# Patient Record
Sex: FEMALE | Race: WHITE | NOT HISPANIC OR LATINO | Employment: FULL TIME | URBAN - NONMETROPOLITAN AREA
[De-identification: names, ages, dates, MRNs, and addresses within clinical notes are randomized per-mention and may not be internally consistent; named-entity substitution may affect disease eponyms.]

---

## 2017-03-27 DIAGNOSIS — G47.419 NARCOLEPSY WITHOUT CATAPLEXY(347.00): ICD-10-CM

## 2017-03-28 RX ORDER — DEXTROAMPHETAMINE SACCHARATE, AMPHETAMINE ASPARTATE, DEXTROAMPHETAMINE SULFATE AND AMPHETAMINE SULFATE 2.5; 2.5; 2.5; 2.5 MG/1; MG/1; MG/1; MG/1
10 TABLET ORAL 3 TIMES DAILY
Qty: 90 TABLET | Refills: 0 | Status: SHIPPED | OUTPATIENT
Start: 2017-03-28 | End: 2017-04-26

## 2017-04-26 DIAGNOSIS — G47.419 NARCOLEPSY WITHOUT CATAPLEXY(347.00): ICD-10-CM

## 2017-05-02 RX ORDER — DEXTROAMPHETAMINE SACCHARATE, AMPHETAMINE ASPARTATE, DEXTROAMPHETAMINE SULFATE AND AMPHETAMINE SULFATE 2.5; 2.5; 2.5; 2.5 MG/1; MG/1; MG/1; MG/1
10 TABLET ORAL 3 TIMES DAILY
Qty: 90 TABLET | Refills: 0 | Status: SHIPPED | OUTPATIENT
Start: 2017-05-26 | End: 2017-07-07

## 2017-05-02 RX ORDER — DEXTROAMPHETAMINE SACCHARATE, AMPHETAMINE ASPARTATE, DEXTROAMPHETAMINE SULFATE AND AMPHETAMINE SULFATE 2.5; 2.5; 2.5; 2.5 MG/1; MG/1; MG/1; MG/1
TABLET ORAL
Qty: 90 TABLET | Refills: 0 | Status: SHIPPED | OUTPATIENT
Start: 2017-06-26 | End: 2017-07-07

## 2017-05-02 RX ORDER — DEXTROAMPHETAMINE SACCHARATE, AMPHETAMINE ASPARTATE, DEXTROAMPHETAMINE SULFATE AND AMPHETAMINE SULFATE 2.5; 2.5; 2.5; 2.5 MG/1; MG/1; MG/1; MG/1
10 TABLET ORAL 3 TIMES DAILY
Qty: 90 TABLET | Refills: 0 | Status: SHIPPED | OUTPATIENT
Start: 2017-05-02 | End: 2017-07-07

## 2017-07-07 DIAGNOSIS — G47.419 NARCOLEPSY WITHOUT CATAPLEXY(347.00): ICD-10-CM

## 2017-07-11 RX ORDER — DEXTROAMPHETAMINE SACCHARATE, AMPHETAMINE ASPARTATE, DEXTROAMPHETAMINE SULFATE AND AMPHETAMINE SULFATE 2.5; 2.5; 2.5; 2.5 MG/1; MG/1; MG/1; MG/1
10 TABLET ORAL 3 TIMES DAILY
Qty: 90 TABLET | Refills: 0 | Status: SHIPPED | OUTPATIENT
Start: 2017-09-08 | End: 2017-10-03

## 2017-07-11 RX ORDER — DEXTROAMPHETAMINE SACCHARATE, AMPHETAMINE ASPARTATE, DEXTROAMPHETAMINE SULFATE AND AMPHETAMINE SULFATE 2.5; 2.5; 2.5; 2.5 MG/1; MG/1; MG/1; MG/1
TABLET ORAL
Qty: 90 TABLET | Refills: 0 | Status: SHIPPED | OUTPATIENT
Start: 2017-07-11 | End: 2017-10-03

## 2017-07-11 RX ORDER — DEXTROAMPHETAMINE SACCHARATE, AMPHETAMINE ASPARTATE, DEXTROAMPHETAMINE SULFATE AND AMPHETAMINE SULFATE 2.5; 2.5; 2.5; 2.5 MG/1; MG/1; MG/1; MG/1
10 TABLET ORAL 3 TIMES DAILY
Qty: 90 TABLET | Refills: 0 | Status: SHIPPED | OUTPATIENT
Start: 2017-08-08 | End: 2017-10-03

## 2017-07-28 ENCOUNTER — TELEPHONE (OUTPATIENT)
Dept: SLEEP MEDICINE | Facility: HOSPITAL | Age: 42
End: 2017-07-28

## 2017-10-03 DIAGNOSIS — G47.419 NARCOLEPSY WITHOUT CATAPLEXY(347.00): ICD-10-CM

## 2017-10-03 RX ORDER — DEXTROAMPHETAMINE SACCHARATE, AMPHETAMINE ASPARTATE, DEXTROAMPHETAMINE SULFATE AND AMPHETAMINE SULFATE 2.5; 2.5; 2.5; 2.5 MG/1; MG/1; MG/1; MG/1
10 TABLET ORAL 3 TIMES DAILY
Qty: 90 TABLET | Refills: 0 | Status: SHIPPED | OUTPATIENT
Start: 2017-12-01 | End: 2017-12-12

## 2017-10-03 RX ORDER — DEXTROAMPHETAMINE SACCHARATE, AMPHETAMINE ASPARTATE, DEXTROAMPHETAMINE SULFATE AND AMPHETAMINE SULFATE 2.5; 2.5; 2.5; 2.5 MG/1; MG/1; MG/1; MG/1
TABLET ORAL
Qty: 90 TABLET | Refills: 0 | Status: SHIPPED | OUTPATIENT
Start: 2017-10-03 | End: 2017-12-12

## 2017-10-03 RX ORDER — DEXTROAMPHETAMINE SACCHARATE, AMPHETAMINE ASPARTATE, DEXTROAMPHETAMINE SULFATE AND AMPHETAMINE SULFATE 2.5; 2.5; 2.5; 2.5 MG/1; MG/1; MG/1; MG/1
10 TABLET ORAL 3 TIMES DAILY
Qty: 90 TABLET | Refills: 0 | Status: SHIPPED | OUTPATIENT
Start: 2017-11-03 | End: 2017-12-12

## 2017-11-03 ENCOUNTER — TELEPHONE (OUTPATIENT)
Dept: SLEEP MEDICINE | Facility: HOSPITAL | Age: 42
End: 2017-11-03

## 2017-11-26 ENCOUNTER — HEALTH MAINTENANCE LETTER (OUTPATIENT)
Age: 42
End: 2017-11-26

## 2017-12-12 DIAGNOSIS — G47.419 NARCOLEPSY WITHOUT CATAPLEXY(347.00): ICD-10-CM

## 2017-12-12 RX ORDER — DEXTROAMPHETAMINE SACCHARATE, AMPHETAMINE ASPARTATE, DEXTROAMPHETAMINE SULFATE AND AMPHETAMINE SULFATE 2.5; 2.5; 2.5; 2.5 MG/1; MG/1; MG/1; MG/1
10 TABLET ORAL 3 TIMES DAILY
Qty: 90 TABLET | Refills: 0 | Status: SHIPPED | OUTPATIENT
Start: 2018-01-12 | End: 2018-02-14

## 2017-12-12 RX ORDER — DEXTROAMPHETAMINE SACCHARATE, AMPHETAMINE ASPARTATE, DEXTROAMPHETAMINE SULFATE AND AMPHETAMINE SULFATE 2.5; 2.5; 2.5; 2.5 MG/1; MG/1; MG/1; MG/1
10 TABLET ORAL 3 TIMES DAILY
Qty: 90 TABLET | Refills: 0 | Status: SHIPPED | OUTPATIENT
Start: 2018-02-12 | End: 2018-02-14

## 2017-12-12 RX ORDER — DEXTROAMPHETAMINE SACCHARATE, AMPHETAMINE ASPARTATE, DEXTROAMPHETAMINE SULFATE AND AMPHETAMINE SULFATE 2.5; 2.5; 2.5; 2.5 MG/1; MG/1; MG/1; MG/1
TABLET ORAL
Qty: 90 TABLET | Refills: 0 | Status: SHIPPED | OUTPATIENT
Start: 2017-12-12 | End: 2018-02-14

## 2018-02-14 DIAGNOSIS — G47.419 NARCOLEPSY WITHOUT CATAPLEXY(347.00): ICD-10-CM

## 2018-02-14 RX ORDER — DEXTROAMPHETAMINE SACCHARATE, AMPHETAMINE ASPARTATE, DEXTROAMPHETAMINE SULFATE AND AMPHETAMINE SULFATE 2.5; 2.5; 2.5; 2.5 MG/1; MG/1; MG/1; MG/1
TABLET ORAL
Qty: 90 TABLET | Refills: 0 | Status: SHIPPED | OUTPATIENT
Start: 2018-04-13 | End: 2018-03-14

## 2018-02-14 RX ORDER — DEXTROAMPHETAMINE SACCHARATE, AMPHETAMINE ASPARTATE, DEXTROAMPHETAMINE SULFATE AND AMPHETAMINE SULFATE 2.5; 2.5; 2.5; 2.5 MG/1; MG/1; MG/1; MG/1
TABLET ORAL
Qty: 90 TABLET | Refills: 0 | Status: SHIPPED | OUTPATIENT
Start: 2018-02-14 | End: 2018-03-14

## 2018-02-14 RX ORDER — DEXTROAMPHETAMINE SACCHARATE, AMPHETAMINE ASPARTATE, DEXTROAMPHETAMINE SULFATE AND AMPHETAMINE SULFATE 2.5; 2.5; 2.5; 2.5 MG/1; MG/1; MG/1; MG/1
TABLET ORAL
Qty: 90 TABLET | Refills: 0 | Status: SHIPPED | OUTPATIENT
Start: 2018-03-14 | End: 2018-03-14

## 2018-03-14 DIAGNOSIS — G47.419 NARCOLEPSY WITHOUT CATAPLEXY(347.00): ICD-10-CM

## 2018-03-20 RX ORDER — DEXTROAMPHETAMINE SACCHARATE, AMPHETAMINE ASPARTATE, DEXTROAMPHETAMINE SULFATE AND AMPHETAMINE SULFATE 2.5; 2.5; 2.5; 2.5 MG/1; MG/1; MG/1; MG/1
TABLET ORAL
Qty: 90 TABLET | Refills: 0 | Status: SHIPPED | OUTPATIENT
Start: 2018-04-20

## 2018-03-20 RX ORDER — DEXTROAMPHETAMINE SACCHARATE, AMPHETAMINE ASPARTATE, DEXTROAMPHETAMINE SULFATE AND AMPHETAMINE SULFATE 2.5; 2.5; 2.5; 2.5 MG/1; MG/1; MG/1; MG/1
TABLET ORAL
Qty: 90 TABLET | Refills: 0 | Status: SHIPPED | OUTPATIENT
Start: 2018-05-18

## 2018-03-20 RX ORDER — DEXTROAMPHETAMINE SACCHARATE, AMPHETAMINE ASPARTATE, DEXTROAMPHETAMINE SULFATE AND AMPHETAMINE SULFATE 2.5; 2.5; 2.5; 2.5 MG/1; MG/1; MG/1; MG/1
TABLET ORAL
Qty: 90 TABLET | Refills: 0 | Status: SHIPPED | OUTPATIENT
Start: 2018-03-20

## 2018-04-17 ENCOUNTER — TELEPHONE (OUTPATIENT)
Dept: SLEEP MEDICINE | Facility: HOSPITAL | Age: 43
End: 2018-04-17

## 2018-04-17 DIAGNOSIS — G47.419 NARCOLEPSY WITHOUT CATAPLEXY(347.00): ICD-10-CM

## 2018-04-17 RX ORDER — DEXTROAMPHETAMINE SACCHARATE, AMPHETAMINE ASPARTATE, DEXTROAMPHETAMINE SULFATE AND AMPHETAMINE SULFATE 2.5; 2.5; 2.5; 2.5 MG/1; MG/1; MG/1; MG/1
TABLET ORAL
Qty: 90 TABLET | Refills: 0 | Status: CANCELLED | OUTPATIENT
Start: 2018-04-20

## 2018-04-17 RX ORDER — DEXTROAMPHETAMINE SACCHARATE, AMPHETAMINE ASPARTATE, DEXTROAMPHETAMINE SULFATE AND AMPHETAMINE SULFATE 2.5; 2.5; 2.5; 2.5 MG/1; MG/1; MG/1; MG/1
TABLET ORAL
Qty: 90 TABLET | Refills: 0 | Status: CANCELLED | OUTPATIENT
Start: 2018-04-17

## 2018-04-17 RX ORDER — DEXTROAMPHETAMINE SACCHARATE, AMPHETAMINE ASPARTATE, DEXTROAMPHETAMINE SULFATE AND AMPHETAMINE SULFATE 2.5; 2.5; 2.5; 2.5 MG/1; MG/1; MG/1; MG/1
TABLET ORAL
Qty: 90 TABLET | Refills: 0 | Status: CANCELLED | OUTPATIENT
Start: 2018-05-18

## 2018-05-30 ENCOUNTER — OFFICE VISIT (OUTPATIENT)
Dept: SLEEP MEDICINE | Facility: HOSPITAL | Age: 43
End: 2018-05-30
Attending: INTERNAL MEDICINE
Payer: MEDICAID

## 2018-05-30 VITALS
BODY MASS INDEX: 31.76 KG/M2 | SYSTOLIC BLOOD PRESSURE: 112 MMHG | WEIGHT: 186 LBS | HEART RATE: 85 BPM | OXYGEN SATURATION: 97 % | DIASTOLIC BLOOD PRESSURE: 80 MMHG | HEIGHT: 64 IN | RESPIRATION RATE: 14 BRPM

## 2018-05-30 DIAGNOSIS — G47.419 NARCOLEPSY WITHOUT CATAPLEXY(347.00): Primary | ICD-10-CM

## 2018-05-30 PROCEDURE — G0463 HOSPITAL OUTPT CLINIC VISIT: HCPCS

## 2018-05-30 PROCEDURE — 99211 OFF/OP EST MAY X REQ PHY/QHP: CPT | Performed by: INTERNAL MEDICINE

## 2018-05-30 RX ORDER — MODAFINIL 200 MG/1
200 TABLET ORAL DAILY
Qty: 30 TABLET | Refills: 3 | Status: SHIPPED | OUTPATIENT
Start: 2018-05-30

## 2018-05-30 RX ORDER — MODAFINIL 200 MG/1
200 TABLET ORAL DAILY
Qty: 30 TABLET | Refills: 0 | Status: SHIPPED | OUTPATIENT
Start: 2018-05-30 | End: 2018-05-30

## 2018-05-30 NOTE — NURSING NOTE
Patient ID checked with name and date of birth. Reviewed allergies and home medications. Took Vitals and brief medicalhistory.

## 2018-05-30 NOTE — MR AVS SNAPSHOT
"              After Visit Summary   2018    Karon Fleming    MRN: [id number]           Patient Information     Date Of Birth          1975        Visit Information        Provider Department      2018 11:30 AM David De La Cruz MD [facility]        Today's Diagnoses     Narcolepsy without cataplexy(347.00)    -  1       Follow-ups after your visit        Who to contact     If you have questions or need follow up information about today's clinic visit or your schedule please contact [facility] directly at [phone].  Normal or non-critical lab and imaging results will be communicated to you by NorSunhart, letter or phone within 4 business days after the clinic has received the results. If you do not hear from us within 7 days, please contact the clinic through Panoratiot or phone. If you have a critical or abnormal lab result, we will notify you by phone as soon as possible.  Submit refill requests through Colppy or call your pharmacy and they will forward the refill request to us. Please allow 3 business days for your refill to be completed.          Additional Information About Your Visit        MyChart Information     Colppy lets you send messages to your doctor, view your test results, renew your prescriptions, schedule appointments and more. To sign up, go to [email or web address] . Click on \"Log in\" on the left side of the screen, which will take you to the Welcome page. Then click on \"Sign up Now\" on the right side of the page.     You will be asked to enter the access code listed below, as well as some personal information. Please follow the directions to create your username and password.     Your access code is: [email or web address]  Expires: 2018 12:09 PM     Your access code will  in 90 days. If you need help or a new code, please call your [facility] or [phone].        Care EveryWhere ID     This is your Care EveryWhere ID. This could be used by other organizations to " "access your [facility] medical records  [id number]        Your Vitals Were     Pulse Respirations Height Pulse Oximetry BMI (Body Mass Index)       85 14 5' 4\" (1.626 m) 97% 31.93 kg/m2        Blood Pressure from Last 3 Encounters:   05/30/18 112/80   08/04/16 124/78   06/30/16 114/72    Weight from Last 3 Encounters:   05/30/18 186 lb (84.4 kg)   06/30/16 187 lb (84.8 kg)   05/12/16 180 lb (81.6 kg)              Today, you had the following     No orders found for display         Today's Medication Changes          These changes are accurate as of 5/30/18 12:09 PM.  If you have any questions, ask your nurse or doctor.               Start taking these medicines.        Dose/Directions    modafinil 200 MG tablet   Commonly known as:  PROVIGIL   Used for:  Narcolepsy without cataplexy(347.00)        Dose:  200 mg   Take 1 tablet (200 mg) by mouth daily   Quantity:  30 tablet   Refills:  3            Where to get your medicines      Some of these will need a paper prescription and others can be bought over the counter.  Ask your nurse if you have questions.     Bring a paper prescription for each of these medications     modafinil 200 MG tablet                Primary Care Provider Office Phone # Fax #    CELSA Bucio [phone]       [address] [facility] [address]        Equal Access to Services     [facility]: Hadii omar ku hadasho Somargaali, waaxda luqadaha, qaybta kaalmada adeegyada, albino trammell . So [facility] [phone].    ATENCIÓN: Si habla español, tiene a cazares disposición servicios gratuitos de asistencia lingüística. Llame al [phone].    We comply with applicable federal civil rights laws and [place] laws. We do not discriminate on the basis of race, color, national origin, age, disability, sex, sexual orientation, or gender identity.            Thank you!     Thank you for choosing [facility]  for your care. Our goal is always to provide you " with excellent care. Hearing back from our patients is one way we can continue to improve our services. Please take a few minutes to complete the written survey that you may receive in the mail after your visit with us. Thank you!             Your Updated Medication List - Protect others around you: Learn how to safely use, store and throw away your medicines at www.disposemymeds.org.          This list is accurate as of 5/30/18 12:09 PM.  Always use your most recent med list.                   Brand Name Dispense Instructions for use Diagnosis    ALEVE PO      Take 220 mg by mouth        * amphetamine-dextroamphetamine 10 MG per tablet    ADDERALL    90 tablet    2 pills in AM, 1 at noon    Narcolepsy without cataplexy(347.00)       * amphetamine-dextroamphetamine 10 MG per tablet    ADDERALL    90 tablet    Take two in the am and one at noon    Narcolepsy without cataplexy(347.00)       * amphetamine-dextroamphetamine 10 MG per tablet    ADDERALL    90 tablet    Take two tabs in am and one at noon    Narcolepsy without cataplexy(347.00)       ASPIRIN PO      Take 81 mg by mouth        INDERAL LA PO      Take 80 mg by mouth daily        loratadine 10 MG tablet    CLARITIN     Take 10 mg by mouth daily        modafinil 200 MG tablet    PROVIGIL    30 tablet    Take 1 tablet (200 mg) by mouth daily    Narcolepsy without cataplexy(347.00)       montelukast 10 MG tablet    SINGULAIR     Take 10 mg by mouth At Bedtime        WELLBUTRIN XL PO      Take 150 mg by mouth daily        ZOCOR PO      Take 10 mg by mouth        * Notice:  This list has 3 medication(s) that are the same as other medications prescribed for you. Read the directions carefully, and ask your doctor or other care provider to review them with you.

## 2018-05-30 NOTE — PROGRESS NOTES
"Has been on adderall 10 tid for a couple years, works pretty well but has developed some tolerance. She does try hard to avoid taking it on weekends. We talked about the problems associated with taking these often only partially effective meds, etc.  PE   /80  Pulse 85  Resp 14  Ht 5' 4\" (1.626 m)  Wt 186 lb (84.4 kg)  SpO2 97%  BMI 31.93 kg/m2                     Current Outpatient Prescriptions:      modafinil (PROVIGIL) 200 MG tablet, Take 1 tablet (200 mg) by mouth daily, Disp: 30 tablet, Rfl: 0     amphetamine-dextroamphetamine (ADDERALL) 10 MG per tablet, 2 pills in AM, 1 at noon, Disp: 90 tablet, Rfl: 0     amphetamine-dextroamphetamine (ADDERALL) 10 MG per tablet, Take two in the am and one at noon, Disp: 90 tablet, Rfl: 0     amphetamine-dextroamphetamine (ADDERALL) 10 MG per tablet, Take two tabs in am and one at noon, Disp: 90 tablet, Rfl: 0     ASPIRIN PO, Take 81 mg by mouth, Disp: , Rfl:      BuPROPion HCl (WELLBUTRIN XL PO), Take 150 mg by mouth daily , Disp: , Rfl:      loratadine (CLARITIN) 10 MG tablet, Take 10 mg by mouth daily, Disp: , Rfl:      montelukast (SINGULAIR) 10 MG tablet, Take 10 mg by mouth At Bedtime, Disp: , Rfl:      Naproxen Sodium (ALEVE PO), Take 220 mg by mouth, Disp: , Rfl:      Propranolol HCl (INDERAL LA PO), Take 80 mg by mouth daily, Disp: , Rfl:      Simvastatin (ZOCOR PO), Take 10 mg by mouth, Disp: , Rfl:      A/ Narcolepsy partially treated, will add modafinil 200 as trial and cont adderall 30 mg total daily.    "

## 2022-10-05 ENCOUNTER — HOSPITAL ENCOUNTER (EMERGENCY)
Facility: HOSPITAL | Age: 47
Discharge: HOME | End: 2022-10-05
Attending: PHYSICIAN ASSISTANT | Admitting: PHYSICIAN ASSISTANT
Payer: COMMERCIAL

## 2022-10-05 VITALS
RESPIRATION RATE: 16 BRPM | TEMPERATURE: 97.8 F | OXYGEN SATURATION: 98 % | DIASTOLIC BLOOD PRESSURE: 99 MMHG | HEART RATE: 99 BPM | SYSTOLIC BLOOD PRESSURE: 155 MMHG

## 2022-10-05 DIAGNOSIS — S05.02XA ABRASION OF LEFT CORNEA, INITIAL ENCOUNTER: ICD-10-CM

## 2022-10-05 PROCEDURE — 99213 OFFICE O/P EST LOW 20 MIN: CPT | Performed by: PHYSICIAN ASSISTANT

## 2022-10-05 PROCEDURE — 250N000009 HC RX 250: Performed by: PHYSICIAN ASSISTANT

## 2022-10-05 PROCEDURE — G0463 HOSPITAL OUTPT CLINIC VISIT: HCPCS

## 2022-10-05 RX ORDER — ERYTHROMYCIN 5 MG/G
OINTMENT OPHTHALMIC ONCE
Status: COMPLETED | OUTPATIENT
Start: 2022-10-05 | End: 2022-10-05

## 2022-10-05 RX ORDER — TETRACAINE HYDROCHLORIDE 5 MG/ML
1-2 SOLUTION OPHTHALMIC ONCE
Status: COMPLETED | OUTPATIENT
Start: 2022-10-05 | End: 2022-10-05

## 2022-10-05 RX ADMIN — TETRACAINE HYDROCHLORIDE 2 DROP: 5 SOLUTION OPHTHALMIC at 17:51

## 2022-10-05 RX ADMIN — ERYTHROMYCIN: 5 OINTMENT OPHTHALMIC at 18:09

## 2022-10-05 ASSESSMENT — ENCOUNTER SYMPTOMS
FEVER: 0
EYE PAIN: 1
PHOTOPHOBIA: 1
EYE REDNESS: 1

## 2022-10-05 NOTE — ED TRIAGE NOTES
Patient states that she had  a cramp above her left eye. States she did lay down for a nap and that she feels like something is in her eye. Does endorse still having her contact in as well.

## 2022-10-05 NOTE — ED TRIAGE NOTES
"Pt presents with pain above left eye. Pt states it feels like \"nerve\" pain . Pain started today. Pt rates current pain 10/10.       "

## 2022-10-05 NOTE — ED PROVIDER NOTES
History     Chief Complaint   Patient presents with     Eye Pain     HPI  Karon Fleming is a 47 year old female who presents to urgent care for evaluation of left eye problem.  Patient states over the last couple days she has noted some crusting  In the corner of her left eye.  She states that today she developed severe redness, pain and photophobia in her left eye.  She denies any mechanism of injury, purulent discharge, cold symptoms, vision changes, or any other associated symptoms.    Allergies:  Allergies   Allergen Reactions     Topamax [Topiramate] Anxiety       Problem List:    There are no problems to display for this patient.       Past Medical History:    No past medical history on file.    Past Surgical History:    No past surgical history on file.    Family History:    No family history on file.    Social History:  Marital Status:   [2]  Social History     Tobacco Use     Smoking status: Current Every Day Smoker     Packs/day: 0.25   Substance Use Topics     Alcohol use: Yes     Comment: 1x month        Medications:    amphetamine-dextroamphetamine (ADDERALL) 10 MG per tablet  amphetamine-dextroamphetamine (ADDERALL) 10 MG per tablet  amphetamine-dextroamphetamine (ADDERALL) 10 MG per tablet  ASPIRIN PO  BuPROPion HCl (WELLBUTRIN XL PO)  loratadine (CLARITIN) 10 MG tablet  modafinil (PROVIGIL) 200 MG tablet  montelukast (SINGULAIR) 10 MG tablet  Naproxen Sodium (ALEVE PO)  Propranolol HCl (INDERAL LA PO)  Simvastatin (ZOCOR PO)          Review of Systems   Constitutional: Negative for fever.   Eyes: Positive for photophobia, pain and redness. Negative for visual disturbance.   All other systems reviewed and are negative.      Physical Exam   BP: 155/99  Pulse: 99  Temp: 97.8  F (36.6  C)  Resp: 16  SpO2: 98 %      Physical Exam  Vitals and nursing note reviewed.   Constitutional:       General: She is not in acute distress.     Appearance: Normal appearance. She is not ill-appearing or  toxic-appearing.   Eyes:      Extraocular Movements: Extraocular movements intact.      Conjunctiva/sclera:      Left eye: Left conjunctiva is injected.        Comments: Fluorescein uptake noted at 12 o'clock position of border of iris and sclera consistent with corneal abrasion..   Cardiovascular:      Rate and Rhythm: Regular rhythm.      Heart sounds: Normal heart sounds.   Pulmonary:      Breath sounds: Normal breath sounds.   Neurological:      Mental Status: She is oriented to person, place, and time.         ED Course                 Procedures             Critical Care time:               No results found for this or any previous visit (from the past 24 hour(s)).    Medications   tetracaine (PONTOCAINE) 0.5 % ophthalmic solution 1-2 drop (2 drops Left Eye Given 10/5/22 1751)   erythromycin (ROMYCIN) ophthalmic ointment ( Left Eye Given 10/5/22 1809)       Assessments & Plan (with Medical Decision Making)   #1.  Corneal abrasion    Discussed exam findings with patient.  Patient is instructed to leave contacts out of her eye to avoid continued irritation.  Patient was given erythromycin ointment tonight in urgent care and is instructed to use it twice a day for the next 7 days.  I would like patient to follow-up tomorrow with an eye clinic for further evaluation.  Any additional concerns patient can return to urgent care or follow-up with primary care provider.  Patient verbalized understanding and agreement of plan.        I have reviewed the nursing notes.    I have reviewed the findings, diagnosis, plan and need for follow up with the patient.    New Prescriptions    No medications on file       Final diagnoses:   Abrasion of left cornea, initial encounter       10/5/2022   [facility]     Remy Storey PA-C  10/05/22 6249

## 2023-01-15 PROCEDURE — 99285 EMERGENCY DEPT VISIT HI MDM: CPT | Mod: 25

## 2023-01-15 PROCEDURE — 99283 EMERGENCY DEPT VISIT LOW MDM: CPT | Performed by: EMERGENCY MEDICINE

## 2023-01-16 ENCOUNTER — HOSPITAL ENCOUNTER (EMERGENCY)
Facility: HOSPITAL | Age: 48
Discharge: HOME | End: 2023-01-16
Attending: EMERGENCY MEDICINE | Admitting: EMERGENCY MEDICINE
Payer: COMMERCIAL

## 2023-01-16 ENCOUNTER — APPOINTMENT (OUTPATIENT)
Dept: GENERAL RADIOLOGY | Facility: HOSPITAL | Age: 48
End: 2023-01-16
Attending: EMERGENCY MEDICINE
Payer: COMMERCIAL

## 2023-01-16 ENCOUNTER — APPOINTMENT (OUTPATIENT)
Dept: CT IMAGING | Facility: HOSPITAL | Age: 48
End: 2023-01-16
Attending: EMERGENCY MEDICINE
Payer: COMMERCIAL

## 2023-01-16 VITALS
DIASTOLIC BLOOD PRESSURE: 84 MMHG | OXYGEN SATURATION: 97 % | WEIGHT: 180 LBS | HEIGHT: 64 IN | SYSTOLIC BLOOD PRESSURE: 132 MMHG | RESPIRATION RATE: 18 BRPM | HEART RATE: 91 BPM | TEMPERATURE: 97.5 F | BODY MASS INDEX: 30.73 KG/M2

## 2023-01-16 DIAGNOSIS — S93.402A SPRAIN OF LEFT ANKLE, UNSPECIFIED LIGAMENT, INITIAL ENCOUNTER: ICD-10-CM

## 2023-01-16 PROCEDURE — 73700 CT LOWER EXTREMITY W/O DYE: CPT | Mod: LT

## 2023-01-16 PROCEDURE — 250N000013 HC RX MED GY IP 250 OP 250 PS 637: Performed by: EMERGENCY MEDICINE

## 2023-01-16 PROCEDURE — 73630 X-RAY EXAM OF FOOT: CPT | Mod: LT

## 2023-01-16 PROCEDURE — 73610 X-RAY EXAM OF ANKLE: CPT | Mod: LT

## 2023-01-16 RX ORDER — IBUPROFEN 800 MG/1
800 TABLET, FILM COATED ORAL ONCE
Status: COMPLETED | OUTPATIENT
Start: 2023-01-16 | End: 2023-01-16

## 2023-01-16 RX ADMIN — IBUPROFEN 800 MG: 800 TABLET ORAL at 00:21

## 2023-01-16 ASSESSMENT — ACTIVITIES OF DAILY LIVING (ADL)
ADLS_ACUITY_SCORE: 35
ADLS_ACUITY_SCORE: 35

## 2023-01-16 NOTE — ED TRIAGE NOTES
Twisted ankle shovering a couple of weeks ago. Was wearing boot and was able to walk on it, was improving. Yesterday evening twisted left foot again and now has 6/10 pain at rest. CMS intact. Some swelling noted to left ankle.

## 2023-01-20 ASSESSMENT — ENCOUNTER SYMPTOMS
FEVER: 0
COUGH: 0
SHORTNESS OF BREATH: 0
CHILLS: 0

## 2023-01-20 NOTE — ED PROVIDER NOTES
"  History     Chief Complaint   Patient presents with     Foot Pain     HPI  Karon Fleming is a 47 year old female who is here with left foot and ankle pain.  Twisted it yesterday evening.  Able to ambulate however with marked difficulty.  No decrease in range of motion.  No decrease in sensation.  No other complaints.    Allergies:  Allergies   Allergen Reactions     Topamax [Topiramate] Anxiety       Problem List:    There are no problems to display for this patient.       Past Medical History:    No past medical history on file.    Past Surgical History:    No past surgical history on file.    Family History:    No family history on file.    Social History:  Marital Status:   [2]  Social History     Tobacco Use     Smoking status: Every Day     Packs/day: 0.25     Types: Cigarettes   Substance Use Topics     Alcohol use: Yes     Comment: 1x month        Medications:    amphetamine-dextroamphetamine (ADDERALL) 10 MG per tablet  amphetamine-dextroamphetamine (ADDERALL) 10 MG per tablet  amphetamine-dextroamphetamine (ADDERALL) 10 MG per tablet  ASPIRIN PO  BuPROPion HCl (WELLBUTRIN XL PO)  loratadine (CLARITIN) 10 MG tablet  modafinil (PROVIGIL) 200 MG tablet  montelukast (SINGULAIR) 10 MG tablet  Naproxen Sodium (ALEVE PO)  Propranolol HCl (INDERAL LA PO)  Simvastatin (ZOCOR PO)          Review of Systems   Constitutional: Negative for chills and fever.   Respiratory: Negative for cough and shortness of breath.    All other systems reviewed and are negative.      Physical Exam   BP: 132/84  Pulse: 91  Temp: 97.5  F (36.4  C)  Resp: 18  Height: 162.6 cm (5' 4\")  Weight: 81.6 kg (180 lb)  SpO2: 97 %      Physical Exam  Constitutional:       General: She is not in acute distress.     Appearance: Normal appearance.   HENT:      Head: Normocephalic and atraumatic.      Right Ear: External ear normal.      Left Ear: External ear normal.      Nose: Nose normal.   Eyes:      General: No scleral icterus.     " Extraocular Movements: Extraocular movements intact.   Pulmonary:      Effort: Pulmonary effort is normal. No respiratory distress.   Musculoskeletal:         General: Swelling and signs of injury present. No deformity.      Comments: Swelling and tenderness to left ankle diffusely, no midfoot tenderness, 2+ dorsalis pedis pulse   Skin:     General: Skin is dry.      Capillary Refill: Capillary refill takes less than 2 seconds.      Coloration: Skin is not jaundiced.   Neurological:      General: No focal deficit present.      Mental Status: She is alert and oriented to person, place, and time.   Psychiatric:         Mood and Affect: Mood normal.         Behavior: Behavior normal.         ED Course              ED Course as of 01/20/23 0310 Mon Jan 16, 2023   0204 ? Bimalleolar fx, will get ct ankle     Procedures             Critical Care time:               No results found for this or any previous visit (from the past 24 hour(s)).    Medications   ibuprofen (ADVIL/MOTRIN) tablet 800 mg (800 mg Oral Given 1/16/23 0021)       Assessments & Plan (with Medical Decision Making)     I have reviewed the nursing notes.    I have reviewed the findings, diagnosis, plan and need for follow up with the patient.      Medical Decision Making  The patient presented with a problem that is acute and uncomplicated.    The patient's evaluation involved:  ordering and review of 3+ test(s) (see separate area of note for details)    The patient's management involved only simple and very low risk treatment.    47-year-old male here with left ankle pain, questionable fracture seen on film, obtain CT which did not show any fracture, sprain, sent home with crutches.    Discharge Medication List as of 1/16/2023  2:32 AM          Final diagnoses:   Sprain of left ankle, unspecified ligament, initial encounter       1/15/2023   [facility]     Keon Franklin MD  01/20/23 0314

## 2023-04-29 ENCOUNTER — HEALTH MAINTENANCE LETTER (OUTPATIENT)
Age: 48
End: 2023-04-29

## 2024-03-29 ENCOUNTER — HOSPITAL ENCOUNTER (EMERGENCY)
Facility: HOSPITAL | Age: 49
End: 2024-03-29
Payer: COMMERCIAL

## 2024-07-06 ENCOUNTER — HEALTH MAINTENANCE LETTER (OUTPATIENT)
Age: 49
End: 2024-07-06

## 2025-03-05 ENCOUNTER — APPOINTMENT (OUTPATIENT)
Dept: GENERAL RADIOLOGY | Facility: HOSPITAL | Age: 50
End: 2025-03-05
Attending: INTERNAL MEDICINE
Payer: COMMERCIAL

## 2025-03-05 ENCOUNTER — HOSPITAL ENCOUNTER (EMERGENCY)
Facility: HOSPITAL | Age: 50
Discharge: HOME | End: 2025-03-05
Attending: INTERNAL MEDICINE
Payer: COMMERCIAL

## 2025-03-05 VITALS
SYSTOLIC BLOOD PRESSURE: 185 MMHG | RESPIRATION RATE: 28 BRPM | HEART RATE: 90 BPM | OXYGEN SATURATION: 99 % | DIASTOLIC BLOOD PRESSURE: 113 MMHG | TEMPERATURE: 97.6 F

## 2025-03-05 DIAGNOSIS — S52.502A CLOSED FRACTURE OF DISTAL END OF LEFT RADIUS, UNSPECIFIED FRACTURE MORPHOLOGY, INITIAL ENCOUNTER: ICD-10-CM

## 2025-03-05 PROCEDURE — 96372 THER/PROPH/DIAG INJ SC/IM: CPT | Performed by: INTERNAL MEDICINE

## 2025-03-05 PROCEDURE — 99284 EMERGENCY DEPT VISIT MOD MDM: CPT

## 2025-03-05 PROCEDURE — 250N000011 HC RX IP 250 OP 636: Performed by: INTERNAL MEDICINE

## 2025-03-05 PROCEDURE — 93005 ELECTROCARDIOGRAM TRACING: CPT

## 2025-03-05 PROCEDURE — 25605 CLTX DST RDL FX/EPHYS SEP W/: CPT | Mod: 54 | Performed by: INTERNAL MEDICINE

## 2025-03-05 PROCEDURE — 99284 EMERGENCY DEPT VISIT MOD MDM: CPT | Mod: 25

## 2025-03-05 PROCEDURE — 250N000009 HC RX 250: Performed by: INTERNAL MEDICINE

## 2025-03-05 PROCEDURE — 25605 CLTX DST RDL FX/EPHYS SEP W/: CPT | Mod: LT

## 2025-03-05 PROCEDURE — 73110 X-RAY EXAM OF WRIST: CPT | Mod: LT

## 2025-03-05 RX ORDER — OXYCODONE HYDROCHLORIDE 5 MG/1
5 TABLET ORAL EVERY 6 HOURS PRN
Qty: 6 TABLET | Refills: 0 | Status: SHIPPED | OUTPATIENT
Start: 2025-03-05

## 2025-03-05 RX ORDER — LIDOCAINE HYDROCHLORIDE 20 MG/ML
40 INJECTION, SOLUTION INFILTRATION; PERINEURAL ONCE
Status: COMPLETED | OUTPATIENT
Start: 2025-03-05 | End: 2025-03-05

## 2025-03-05 RX ORDER — KETOROLAC TROMETHAMINE 30 MG/ML
30 INJECTION, SOLUTION INTRAMUSCULAR; INTRAVENOUS ONCE
Status: DISCONTINUED | OUTPATIENT
Start: 2025-03-05 | End: 2025-03-05

## 2025-03-05 RX ORDER — KETOROLAC TROMETHAMINE 30 MG/ML
60 INJECTION, SOLUTION INTRAMUSCULAR; INTRAVENOUS ONCE
Status: COMPLETED | OUTPATIENT
Start: 2025-03-05 | End: 2025-03-05

## 2025-03-05 RX ADMIN — LIDOCAINE HYDROCHLORIDE 40 MG: 20 INJECTION, SOLUTION INFILTRATION; PERINEURAL at 03:47

## 2025-03-05 RX ADMIN — KETOROLAC TROMETHAMINE 60 MG: 30 INJECTION, SOLUTION INTRAMUSCULAR at 02:55

## 2025-03-05 RX ADMIN — HYDROMORPHONE HYDROCHLORIDE 1 MG: 1 INJECTION, SOLUTION INTRAMUSCULAR; INTRAVENOUS; SUBCUTANEOUS at 02:56

## 2025-03-05 ASSESSMENT — ENCOUNTER SYMPTOMS
FEVER: 0
SHORTNESS OF BREATH: 0
CHILLS: 0
NAUSEA: 0
SORE THROAT: 0
NECK STIFFNESS: 0
HEADACHES: 0
DIZZINESS: 0
HEMATURIA: 0
DIARRHEA: 0
ACTIVITY CHANGE: 0
EYE REDNESS: 0
VOMITING: 0
APPETITE CHANGE: 0
RHINORRHEA: 0
DYSURIA: 0
ABDOMINAL PAIN: 0
MYALGIAS: 0
ARTHRALGIAS: 0
FATIGUE: 0
COUGH: 0

## 2025-03-05 ASSESSMENT — ACTIVITIES OF DAILY LIVING (ADL)
ADLS_ACUITY_SCORE: 41

## 2025-03-05 NOTE — ED TRIAGE NOTES
Patient presents after slipping on ice and falling while walking dog. Patient states she fell about 30 minutes PTA. Patient landed on left wrist and bottom. Deformity to left wrist noted. Dr. Bass at bedside to assess.

## 2025-03-05 NOTE — ED PROVIDER NOTES
History     Chief Complaint   Patient presents with    Fall    Wrist Pain     Left wrist     The history is provided by the patient.   Trauma  Mechanism of injury: Fall  Injury location: shoulder/arm  Injury location detail: L wrist  Incident location: home  Time since incident: 30 minutes     Fall:       Fall occurred: tripped       Impact surface: ice       Point of impact: outstretched arms    Current symptoms:       Associated symptoms:             Denies abdominal pain, chest pain, headache, nausea and vomiting.       Allergies:  Allergies   Allergen Reactions    Other Environmental Allergy      Hay-fever    Topamax [Topiramate] Anxiety       Problem List:    There are no active problems to display for this patient.       Past Medical History:    History reviewed. No pertinent past medical history.    Past Surgical History:    History reviewed. No pertinent surgical history.    Family History:    No family history on file.    Social History:  Marital Status:   [2]  Social History     Tobacco Use    Smoking status: Every Day     Current packs/day: 0.25     Types: Cigarettes   Substance Use Topics    Alcohol use: Yes     Comment: 1x month        Medications:    oxyCODONE (ROXICODONE) 5 MG tablet  amphetamine-dextroamphetamine (ADDERALL) 10 MG per tablet  amphetamine-dextroamphetamine (ADDERALL) 10 MG per tablet  amphetamine-dextroamphetamine (ADDERALL) 10 MG per tablet  ASPIRIN PO  BuPROPion HCl (WELLBUTRIN XL PO)  loratadine (CLARITIN) 10 MG tablet  modafinil (PROVIGIL) 200 MG tablet  montelukast (SINGULAIR) 10 MG tablet  Naproxen Sodium (ALEVE PO)  Propranolol HCl (INDERAL LA PO)  Simvastatin (ZOCOR PO)          Review of Systems   Constitutional:  Negative for activity change, appetite change, chills, fatigue and fever.   HENT:  Negative for congestion, rhinorrhea and sore throat.    Eyes:  Negative for redness.   Respiratory:  Negative for cough and shortness of breath.    Cardiovascular:  Negative  for chest pain.   Gastrointestinal:  Negative for abdominal pain, diarrhea, nausea and vomiting.   Genitourinary:  Negative for dysuria and hematuria.   Musculoskeletal:  Negative for arthralgias, myalgias and neck stiffness.   Skin:  Negative for rash.   Neurological:  Negative for dizziness and headaches.       Physical Exam   BP: (!) 185/112  Pulse: 104  Temp: 97.6  F (36.4  C)  Resp: 28  SpO2: 99 %      Physical Exam  Constitutional:       General: She is not in acute distress.     Appearance: Normal appearance. She is not diaphoretic.   HENT:      Head: Atraumatic.      Mouth/Throat:      Mouth: Mucous membranes are moist.   Eyes:      General: No scleral icterus.     Conjunctiva/sclera: Conjunctivae normal.   Cardiovascular:      Rate and Rhythm: Normal rate.      Heart sounds: Normal heart sounds.   Pulmonary:      Effort: No respiratory distress.      Breath sounds: Normal breath sounds.   Abdominal:      General: Abdomen is flat.   Musculoskeletal:      Right wrist: Normal. No swelling or deformity.      Left wrist: Swelling, deformity, tenderness and bony tenderness present. Decreased range of motion. Normal pulse.        Arms:       Cervical back: Neck supple.   Skin:     General: Skin is warm.      Findings: No rash.   Neurological:      Mental Status: She is alert.         ED Course        [facility]    -Fracture    Date/Time: 3/5/2025 5:05 AM    Performed by: Renan Bass MD  Authorized by: Renan Bass MD    Risks, benefits and alternatives discussed.      INJURY      Injury location:  Forearm    Forearm fracture type: distal radial      PRE PROCEDURE ASSESSMENT      Neurological function: normal      Distal perfusion: normal      Range of motion: reduced      ANESTHESIA (see MAR for exact dosages)      Anesthesia method:  Local infiltration    Local anesthetic:  Lidocaine 2% w/o epi    PROCEDURE DETAILS:     Manipulation performed: yes      Skin traction used: yes      Skeletal traction  used: yes      Immobilization:  Splint    Splint type:  Sugar tong    Supplies used:  Ortho-Glass    POST PROCEDURE ASSESSMENT      Neurological function: normal      Distal perfusion: normal      Range of motion: improved      Patient will be referred for a decision regarding definitive fracture treatment (non-operative vs operative).    PROCEDURE    Patient Tolerance:  Patient tolerated the procedure well with no immediate complications                    Results for orders placed or performed during the hospital encounter of 03/05/25 (from the past 24 hours)   XR Wrist Left G/E 3 Views    Narrative    EXAM: XR WRIST LEFT G/E 3 VIEWS  LOCATION: [facility]  DATE: 3/5/2025    INDICATION: fall, wrist pain  COMPARISON: None.      Impression    IMPRESSION: There is an acute, impacted, comminuted, and dorsally displaced and angulated fracture of the distal left radius. The distal ulna is intact. The carpal bones are intact. Soft tissue swelling.       Medications   ketorolac (TORADOL) injection 60 mg (60 mg Intramuscular $Given 3/5/25 0255)   HYDROmorphone (DILAUDID) injection 1 mg (1 mg Intramuscular $Given 3/5/25 0256)   lidocaine injection 2% (40 mg Other $Given 3/5/25 0347)       Assessments & Plan (with Medical Decision Making)   Left distal radius comminuted fracture  Intra hematoma block and traction applied, alignment improved  Follow-up with ortho clinic for reeval in less than a week  Return to ER if pain increased, finger discoloration , or any other unusual symptoms  Pt voiced understanding and agreed.   I have reviewed the nursing notes.    I have reviewed the findings, diagnosis, plan and need for follow up with the patient.        Discharge Medication List as of 3/5/2025  4:43 AM        START taking these medications    Details   oxyCODONE (ROXICODONE) 5 MG tablet Take 1 tablet (5 mg) by mouth every 6 hours as needed for severe pain., Disp-6 tablet, R-0, [facility]             Final diagnoses:    Closed fracture of distal end of left radius, unspecified fracture morphology, initial encounter       3/5/2025   [facility]       Renan Bass MD  03/05/25 3527

## 2025-05-11 ENCOUNTER — HEALTH MAINTENANCE LETTER (OUTPATIENT)
Age: 50
End: 2025-05-11

## 2025-07-13 ENCOUNTER — HEALTH MAINTENANCE LETTER (OUTPATIENT)
Age: 50
End: 2025-07-13